# Patient Record
Sex: MALE | URBAN - METROPOLITAN AREA
[De-identification: names, ages, dates, MRNs, and addresses within clinical notes are randomized per-mention and may not be internally consistent; named-entity substitution may affect disease eponyms.]

---

## 2022-01-17 ENCOUNTER — NEW REFERRAL (OUTPATIENT)
Dept: URBAN - METROPOLITAN AREA CLINIC 11 | Facility: CLINIC | Age: 77
End: 2022-01-17

## 2022-01-17 DIAGNOSIS — H17.9: ICD-10-CM

## 2022-01-17 DIAGNOSIS — H25.11: ICD-10-CM

## 2022-01-17 DIAGNOSIS — H40.9: ICD-10-CM

## 2022-01-17 DIAGNOSIS — H40.1124: ICD-10-CM

## 2022-01-17 DIAGNOSIS — H34.8110: ICD-10-CM

## 2022-01-17 PROCEDURE — 67028 INJECTION EYE DRUG: CPT

## 2022-01-17 PROCEDURE — 92134 CPTRZ OPH DX IMG PST SGM RTA: CPT

## 2022-01-17 PROCEDURE — 92004 COMPRE OPH EXAM NEW PT 1/>: CPT | Mod: 25

## 2022-01-17 PROCEDURE — P EYLEA PFS

## 2022-01-17 ASSESSMENT — TONOMETRY: OD_IOP_MMHG: 14

## 2022-01-17 ASSESSMENT — VISUAL ACUITY: OD_CC: 20/400

## 2022-02-21 ENCOUNTER — FOLLOW UP (OUTPATIENT)
Dept: URBAN - METROPOLITAN AREA CLINIC 11 | Facility: CLINIC | Age: 77
End: 2022-02-21

## 2022-02-21 DIAGNOSIS — H40.1124: ICD-10-CM

## 2022-02-21 DIAGNOSIS — H17.9: ICD-10-CM

## 2022-02-21 DIAGNOSIS — H34.8110: ICD-10-CM

## 2022-02-21 PROCEDURE — 67028 INJECTION EYE DRUG: CPT

## 2022-02-21 PROCEDURE — 92012 INTRM OPH EXAM EST PATIENT: CPT | Mod: 25

## 2022-02-21 PROCEDURE — P EYLEA PFS

## 2022-02-21 PROCEDURE — 92134 CPTRZ OPH DX IMG PST SGM RTA: CPT

## 2022-02-21 ASSESSMENT — VISUAL ACUITY: OD_CC: 20/200-1

## 2022-02-21 ASSESSMENT — TONOMETRY: OD_IOP_MMHG: 20

## 2022-03-07 ENCOUNTER — UNSCHEDULED FOLLOW UP (OUTPATIENT)
Dept: URBAN - METROPOLITAN AREA CLINIC 11 | Facility: CLINIC | Age: 77
End: 2022-03-07

## 2022-03-07 DIAGNOSIS — H34.8110: ICD-10-CM

## 2022-03-07 DIAGNOSIS — H53.9: ICD-10-CM

## 2022-03-07 PROCEDURE — 92134 CPTRZ OPH DX IMG PST SGM RTA: CPT

## 2022-03-07 PROCEDURE — 92012 INTRM OPH EXAM EST PATIENT: CPT

## 2022-03-07 ASSESSMENT — TONOMETRY: OD_IOP_MMHG: 17

## 2022-03-07 ASSESSMENT — VISUAL ACUITY: OD_CC: 20/400

## 2022-04-04 ENCOUNTER — FOLLOW UP (OUTPATIENT)
Dept: URBAN - METROPOLITAN AREA CLINIC 11 | Facility: CLINIC | Age: 77
End: 2022-04-04

## 2022-04-04 DIAGNOSIS — H34.8110: ICD-10-CM

## 2022-04-04 PROCEDURE — 92134 CPTRZ OPH DX IMG PST SGM RTA: CPT

## 2022-04-04 PROCEDURE — 92012 INTRM OPH EXAM EST PATIENT: CPT

## 2022-04-04 ASSESSMENT — VISUAL ACUITY: OD_CC: 20/400

## 2022-04-04 ASSESSMENT — TONOMETRY: OD_IOP_MMHG: 12

## 2022-05-16 ENCOUNTER — FOLLOW UP (OUTPATIENT)
Dept: URBAN - METROPOLITAN AREA CLINIC 11 | Facility: CLINIC | Age: 77
End: 2022-05-16

## 2022-05-16 DIAGNOSIS — H34.8110: ICD-10-CM

## 2022-05-16 PROCEDURE — 92134 CPTRZ OPH DX IMG PST SGM RTA: CPT

## 2022-05-16 PROCEDURE — 92012 INTRM OPH EXAM EST PATIENT: CPT

## 2022-05-16 ASSESSMENT — TONOMETRY: OD_IOP_MMHG: 16

## 2022-05-16 ASSESSMENT — VISUAL ACUITY: OD_CC: 20/400

## 2022-07-11 ENCOUNTER — TELEPHONE (OUTPATIENT)
Dept: SCHEDULING | Facility: CLINIC | Age: 77
End: 2022-07-11
Payer: MEDICARE

## 2022-07-18 ENCOUNTER — FOLLOW UP (OUTPATIENT)
Dept: URBAN - METROPOLITAN AREA CLINIC 11 | Facility: CLINIC | Age: 77
End: 2022-07-18

## 2022-07-18 DIAGNOSIS — H34.8110: ICD-10-CM

## 2022-07-18 DIAGNOSIS — H25.11: ICD-10-CM

## 2022-07-18 PROCEDURE — 92014 COMPRE OPH EXAM EST PT 1/>: CPT | Mod: 25

## 2022-07-18 PROCEDURE — 92134 CPTRZ OPH DX IMG PST SGM RTA: CPT

## 2022-07-18 PROCEDURE — P EYLEA PFS

## 2022-07-18 PROCEDURE — 67028 INJECTION EYE DRUG: CPT

## 2022-07-18 ASSESSMENT — TONOMETRY: OD_IOP_MMHG: 20

## 2022-07-18 ASSESSMENT — VISUAL ACUITY: OD_CC: 20/400

## 2022-08-01 NOTE — TELEPHONE ENCOUNTER
Pt's son, Sanju, calling to report that pt received a phone call offering to have pt's appt time changed from 2pm to 1pm.    Pt confirmed that he is okay with changing his appt time to 1pm.    Sanju can be reached at [phone]

## 2022-08-22 ENCOUNTER — FOLLOW UP (OUTPATIENT)
Dept: URBAN - METROPOLITAN AREA CLINIC 11 | Facility: CLINIC | Age: 77
End: 2022-08-22

## 2022-08-22 DIAGNOSIS — H40.1124: ICD-10-CM

## 2022-08-22 DIAGNOSIS — H34.8110: ICD-10-CM

## 2022-08-22 DIAGNOSIS — H17.9: ICD-10-CM

## 2022-08-22 PROCEDURE — 92012 INTRM OPH EXAM EST PATIENT: CPT

## 2022-08-22 PROCEDURE — 92134 CPTRZ OPH DX IMG PST SGM RTA: CPT

## 2022-08-22 ASSESSMENT — TONOMETRY: OD_IOP_MMHG: 19

## 2022-08-22 ASSESSMENT — VISUAL ACUITY: OD_CC: 20/400

## 2022-11-28 ENCOUNTER — 3 MONTH EXAM (OUTPATIENT)
Dept: URBAN - METROPOLITAN AREA CLINIC 11 | Facility: CLINIC | Age: 77
End: 2022-11-28

## 2022-11-28 DIAGNOSIS — H34.8110: ICD-10-CM

## 2022-11-28 PROCEDURE — 67028 INJECTION EYE DRUG: CPT

## 2022-11-28 PROCEDURE — 92134 CPTRZ OPH DX IMG PST SGM RTA: CPT

## 2022-11-28 PROCEDURE — 92014 COMPRE OPH EXAM EST PT 1/>: CPT | Mod: 25

## 2022-11-28 PROCEDURE — P EYLEA PFS

## 2022-11-28 ASSESSMENT — TONOMETRY: OD_IOP_MMHG: 20

## 2022-11-28 ASSESSMENT — VISUAL ACUITY: OD_CC: 20/400

## 2023-02-15 ENCOUNTER — 3 MONTH EXAM (OUTPATIENT)
Dept: URBAN - METROPOLITAN AREA CLINIC 11 | Facility: CLINIC | Age: 78
End: 2023-02-15

## 2023-02-15 DIAGNOSIS — H34.8110: ICD-10-CM

## 2023-02-15 PROCEDURE — 92012 INTRM OPH EXAM EST PATIENT: CPT

## 2023-02-15 PROCEDURE — 92134 CPTRZ OPH DX IMG PST SGM RTA: CPT

## 2023-02-15 ASSESSMENT — VISUAL ACUITY: OD_CC: 20/400

## 2023-02-15 ASSESSMENT — TONOMETRY: OD_IOP_MMHG: 20

## 2023-04-26 ENCOUNTER — UNSCHEDULED FOLLOW UP (OUTPATIENT)
Dept: URBAN - METROPOLITAN AREA CLINIC 11 | Facility: CLINIC | Age: 78
End: 2023-04-26

## 2023-04-26 DIAGNOSIS — H25.11: ICD-10-CM

## 2023-04-26 DIAGNOSIS — H40.1124: ICD-10-CM

## 2023-04-26 DIAGNOSIS — H34.8110: ICD-10-CM

## 2023-04-26 DIAGNOSIS — H40.9: ICD-10-CM

## 2023-04-26 DIAGNOSIS — H17.9: ICD-10-CM

## 2023-04-26 PROCEDURE — 67028 INJECTION EYE DRUG: CPT

## 2023-04-26 PROCEDURE — P EYLEA PFS

## 2023-04-26 PROCEDURE — 92014 COMPRE OPH EXAM EST PT 1/>: CPT | Mod: 25

## 2023-04-26 PROCEDURE — 92134 CPTRZ OPH DX IMG PST SGM RTA: CPT

## 2023-04-26 ASSESSMENT — VISUAL ACUITY: OD_CC: 20/400

## 2023-04-26 ASSESSMENT — TONOMETRY
OS_IOP_MMHG: 34
OD_IOP_MMHG: 20

## 2023-05-24 ENCOUNTER — FOLLOW UP (OUTPATIENT)
Dept: URBAN - METROPOLITAN AREA CLINIC 11 | Facility: CLINIC | Age: 78
End: 2023-05-24

## 2023-05-24 DIAGNOSIS — H34.8110: ICD-10-CM

## 2023-05-24 PROCEDURE — 92012 INTRM OPH EXAM EST PATIENT: CPT | Mod: 25

## 2023-05-24 PROCEDURE — 67028 INJECTION EYE DRUG: CPT

## 2023-05-24 PROCEDURE — P EYLEA PFS

## 2023-05-24 PROCEDURE — 92134 CPTRZ OPH DX IMG PST SGM RTA: CPT

## 2023-05-24 ASSESSMENT — VISUAL ACUITY: OD_CC: 20/200-1

## 2023-05-24 ASSESSMENT — TONOMETRY: OD_IOP_MMHG: 18

## 2023-06-28 ENCOUNTER — FOLLOW UP (OUTPATIENT)
Dept: URBAN - METROPOLITAN AREA CLINIC 11 | Facility: CLINIC | Age: 78
End: 2023-06-28

## 2023-06-28 DIAGNOSIS — H34.8110: ICD-10-CM

## 2023-06-28 PROCEDURE — P EYLEA PFS

## 2023-06-28 PROCEDURE — 92012 INTRM OPH EXAM EST PATIENT: CPT | Mod: 25

## 2023-06-28 PROCEDURE — 92134 CPTRZ OPH DX IMG PST SGM RTA: CPT

## 2023-06-28 PROCEDURE — 67028 INJECTION EYE DRUG: CPT

## 2023-06-28 ASSESSMENT — VISUAL ACUITY: OD_CC: 20/200-1

## 2023-06-28 ASSESSMENT — TONOMETRY: OD_IOP_MMHG: 16

## 2023-08-16 ENCOUNTER — FOLLOW UP (OUTPATIENT)
Dept: URBAN - METROPOLITAN AREA CLINIC 11 | Facility: CLINIC | Age: 78
End: 2023-08-16

## 2023-08-16 DIAGNOSIS — H34.8110: ICD-10-CM

## 2023-08-16 PROCEDURE — 92012 INTRM OPH EXAM EST PATIENT: CPT | Mod: 25

## 2023-08-16 PROCEDURE — 92134 CPTRZ OPH DX IMG PST SGM RTA: CPT

## 2023-08-16 PROCEDURE — P EYLEA PFS

## 2023-08-16 PROCEDURE — 67028 INJECTION EYE DRUG: CPT

## 2023-08-16 ASSESSMENT — VISUAL ACUITY: OD_CC: 20/400

## 2023-08-16 ASSESSMENT — TONOMETRY
OD_IOP_MMHG: 21
OS_IOP_MMHG: 45

## 2023-10-18 ENCOUNTER — FOLLOW UP (OUTPATIENT)
Dept: URBAN - METROPOLITAN AREA CLINIC 11 | Facility: CLINIC | Age: 78
End: 2023-10-18

## 2023-10-18 DIAGNOSIS — H34.8110: ICD-10-CM

## 2023-10-18 DIAGNOSIS — H17.9: ICD-10-CM

## 2023-10-18 DIAGNOSIS — H25.11: ICD-10-CM

## 2023-10-18 DIAGNOSIS — H40.1124: ICD-10-CM

## 2023-10-18 DIAGNOSIS — H40.9: ICD-10-CM

## 2023-10-18 PROCEDURE — P EYLEA PFS

## 2023-10-18 PROCEDURE — 67028 INJECTION EYE DRUG: CPT

## 2023-10-18 PROCEDURE — 92014 COMPRE OPH EXAM EST PT 1/>: CPT | Mod: 25

## 2023-10-18 PROCEDURE — 92134 CPTRZ OPH DX IMG PST SGM RTA: CPT

## 2023-10-18 ASSESSMENT — TONOMETRY
OS_IOP_MMHG: 42
OD_IOP_MMHG: 18

## 2023-10-18 ASSESSMENT — VISUAL ACUITY: OD_CC: 20/400

## 2023-12-20 ENCOUNTER — FOLLOW UP (OUTPATIENT)
Dept: URBAN - METROPOLITAN AREA CLINIC 11 | Facility: CLINIC | Age: 78
End: 2023-12-20

## 2023-12-20 DIAGNOSIS — H34.8110: ICD-10-CM

## 2023-12-20 PROCEDURE — P EYLEA PFS: Mod: JZ

## 2023-12-20 PROCEDURE — 92012 INTRM OPH EXAM EST PATIENT: CPT | Mod: 25

## 2023-12-20 PROCEDURE — 92134 CPTRZ OPH DX IMG PST SGM RTA: CPT

## 2023-12-20 PROCEDURE — 67028 INJECTION EYE DRUG: CPT

## 2023-12-20 ASSESSMENT — VISUAL ACUITY: OD_SC: 20/400

## 2023-12-20 ASSESSMENT — TONOMETRY
OS_IOP_MMHG: 38
OD_IOP_MMHG: 17

## 2024-02-21 ENCOUNTER — FOLLOW UP (OUTPATIENT)
Dept: URBAN - METROPOLITAN AREA CLINIC 11 | Facility: CLINIC | Age: 79
End: 2024-02-21

## 2024-02-21 DIAGNOSIS — H40.1124: ICD-10-CM

## 2024-02-21 DIAGNOSIS — H17.9: ICD-10-CM

## 2024-02-21 DIAGNOSIS — H34.8110: ICD-10-CM

## 2024-02-21 DIAGNOSIS — H25.11: ICD-10-CM

## 2024-02-21 DIAGNOSIS — H40.9: ICD-10-CM

## 2024-02-21 PROCEDURE — PFS EYLEA PFS: Mod: JZ

## 2024-02-21 PROCEDURE — 92014 COMPRE OPH EXAM EST PT 1/>: CPT

## 2024-02-21 PROCEDURE — 92134 CPTRZ OPH DX IMG PST SGM RTA: CPT

## 2024-02-21 PROCEDURE — 67028 INJECTION EYE DRUG: CPT

## 2024-02-21 ASSESSMENT — VISUAL ACUITY: OD_SC: 20/400

## 2024-02-21 ASSESSMENT — TONOMETRY
OS_IOP_MMHG: 34
OD_IOP_MMHG: 12

## 2024-04-24 ENCOUNTER — FOLLOW UP (OUTPATIENT)
Dept: URBAN - METROPOLITAN AREA CLINIC 11 | Facility: CLINIC | Age: 79
End: 2024-04-24

## 2024-04-24 DIAGNOSIS — H34.8110: ICD-10-CM

## 2024-04-24 DIAGNOSIS — H40.9: ICD-10-CM

## 2024-04-24 DIAGNOSIS — H17.9: ICD-10-CM

## 2024-04-24 DIAGNOSIS — H25.11: ICD-10-CM

## 2024-04-24 DIAGNOSIS — H40.1124: ICD-10-CM

## 2024-04-24 PROCEDURE — PFS EYLEA PFS: Mod: JZ

## 2024-04-24 PROCEDURE — 76512 OPH US DX B-SCAN: CPT

## 2024-04-24 PROCEDURE — 67028 INJECTION EYE DRUG: CPT

## 2024-04-24 PROCEDURE — 92134 CPTRZ OPH DX IMG PST SGM RTA: CPT

## 2024-04-24 PROCEDURE — 92014 COMPRE OPH EXAM EST PT 1/>: CPT

## 2024-04-24 ASSESSMENT — TONOMETRY
OD_IOP_MMHG: 20
OS_IOP_MMHG: 38

## 2024-04-24 ASSESSMENT — VISUAL ACUITY: OD_CC: 20/400

## 2024-06-26 ENCOUNTER — FOLLOW UP (OUTPATIENT)
Dept: URBAN - METROPOLITAN AREA CLINIC 11 | Facility: CLINIC | Age: 79
End: 2024-06-26

## 2024-06-26 DIAGNOSIS — H43.391: ICD-10-CM

## 2024-06-26 DIAGNOSIS — H25.11: ICD-10-CM

## 2024-06-26 DIAGNOSIS — H17.9: ICD-10-CM

## 2024-06-26 DIAGNOSIS — H40.1124: ICD-10-CM

## 2024-06-26 DIAGNOSIS — H34.8110: ICD-10-CM

## 2024-06-26 PROCEDURE — 92134 CPTRZ OPH DX IMG PST SGM RTA: CPT

## 2024-06-26 PROCEDURE — PFS EYLEA PFS: Mod: JZ

## 2024-06-26 PROCEDURE — 67028 INJECTION EYE DRUG: CPT

## 2024-06-26 PROCEDURE — 92014 COMPRE OPH EXAM EST PT 1/>: CPT

## 2024-06-26 PROCEDURE — 92201 OPSCPY EXTND RTA DRAW UNI/BI: CPT

## 2024-06-26 ASSESSMENT — TONOMETRY
OD_IOP_MMHG: 26
OS_IOP_MMHG: 40

## 2024-06-26 ASSESSMENT — VISUAL ACUITY: OD_CC: 20/200-1

## 2024-09-18 ENCOUNTER — FOLLOW UP (OUTPATIENT)
Dept: URBAN - METROPOLITAN AREA CLINIC 11 | Facility: CLINIC | Age: 79
End: 2024-09-18

## 2024-09-18 DIAGNOSIS — H43.391: ICD-10-CM

## 2024-09-18 DIAGNOSIS — H34.8110: ICD-10-CM

## 2024-09-18 PROCEDURE — 67028 INJECTION EYE DRUG: CPT

## 2024-09-18 PROCEDURE — PFS EYLEA PFS: Mod: JZ

## 2024-09-18 PROCEDURE — 92014 COMPRE OPH EXAM EST PT 1/>: CPT | Mod: 25

## 2024-09-18 PROCEDURE — 92134 CPTRZ OPH DX IMG PST SGM RTA: CPT

## 2024-09-18 ASSESSMENT — TONOMETRY
OD_IOP_MMHG: 21
OS_IOP_MMHG: 34

## 2024-09-18 ASSESSMENT — VISUAL ACUITY: OD_CC: 20/200-1

## 2024-12-18 ENCOUNTER — FOLLOW UP (OUTPATIENT)
Dept: URBAN - METROPOLITAN AREA CLINIC 11 | Facility: CLINIC | Age: 79
End: 2024-12-18

## 2024-12-18 DIAGNOSIS — H34.8110: ICD-10-CM

## 2024-12-18 DIAGNOSIS — H43.391: ICD-10-CM

## 2024-12-18 PROCEDURE — 67028 INJECTION EYE DRUG: CPT

## 2024-12-18 PROCEDURE — 92134 CPTRZ OPH DX IMG PST SGM RTA: CPT

## 2024-12-18 PROCEDURE — PFS EYLEA PFS: Mod: JZ

## 2024-12-18 PROCEDURE — 92014 COMPRE OPH EXAM EST PT 1/>: CPT | Mod: 25

## 2024-12-18 ASSESSMENT — VISUAL ACUITY: OD_CC: 20/200+1

## 2024-12-18 ASSESSMENT — TONOMETRY
OS_IOP_MMHG: 35
OD_IOP_MMHG: 22

## 2025-03-19 ENCOUNTER — FOLLOW UP (OUTPATIENT)
Dept: URBAN - METROPOLITAN AREA CLINIC 11 | Facility: CLINIC | Age: 80
End: 2025-03-19

## 2025-03-19 DIAGNOSIS — H34.8110: ICD-10-CM

## 2025-03-19 DIAGNOSIS — H43.391: ICD-10-CM

## 2025-03-19 PROCEDURE — 92014 COMPRE OPH EXAM EST PT 1/>: CPT | Mod: 25

## 2025-03-19 PROCEDURE — 92202 OPSCPY EXTND ON/MAC DRAW: CPT | Mod: NC

## 2025-03-19 PROCEDURE — 67028 INJECTION EYE DRUG: CPT

## 2025-03-19 PROCEDURE — 92134 CPTRZ OPH DX IMG PST SGM RTA: CPT

## 2025-03-19 PROCEDURE — PFS EYLEA PFS: Mod: JZ

## 2025-03-19 ASSESSMENT — TONOMETRY
OS_IOP_MMHG: 36
OD_IOP_MMHG: 20

## 2025-03-19 ASSESSMENT — VISUAL ACUITY: OD_CC: 20/200+1
